# Patient Record
Sex: MALE | Race: OTHER
[De-identification: names, ages, dates, MRNs, and addresses within clinical notes are randomized per-mention and may not be internally consistent; named-entity substitution may affect disease eponyms.]

---

## 2022-07-05 ENCOUNTER — OFFICE VISIT (OUTPATIENT)
Dept: PRIMARY CARE CLINIC | Facility: CLINIC | Age: 42
End: 2022-07-05

## 2022-07-05 VITALS
HEART RATE: 81 BPM | TEMPERATURE: 98.1 F | DIASTOLIC BLOOD PRESSURE: 84 MMHG | BODY MASS INDEX: 38.87 KG/M2 | WEIGHT: 198 LBS | SYSTOLIC BLOOD PRESSURE: 136 MMHG | OXYGEN SATURATION: 96 % | RESPIRATION RATE: 16 BRPM | HEIGHT: 60 IN

## 2022-07-05 DIAGNOSIS — H10.9 BACTERIAL CONJUNCTIVITIS OF LEFT EYE: Primary | ICD-10-CM

## 2022-07-05 PROCEDURE — 99203 OFFICE O/P NEW LOW 30 MIN: CPT | Performed by: NURSE PRACTITIONER

## 2022-07-05 RX ORDER — POLYMYXIN B SULFATE AND TRIMETHOPRIM 1; 10000 MG/ML; [USP'U]/ML
1 SOLUTION OPHTHALMIC EVERY 4 HOURS
Qty: 1 EACH | Refills: 0 | Status: SHIPPED | OUTPATIENT
Start: 2022-07-05 | End: 2022-07-12

## 2022-07-05 SDOH — ECONOMIC STABILITY: FOOD INSECURITY: WITHIN THE PAST 12 MONTHS, THE FOOD YOU BOUGHT JUST DIDN'T LAST AND YOU DIDN'T HAVE MONEY TO GET MORE.: NEVER TRUE

## 2022-07-05 SDOH — ECONOMIC STABILITY: FOOD INSECURITY: WITHIN THE PAST 12 MONTHS, YOU WORRIED THAT YOUR FOOD WOULD RUN OUT BEFORE YOU GOT MONEY TO BUY MORE.: NEVER TRUE

## 2022-07-05 ASSESSMENT — PATIENT HEALTH QUESTIONNAIRE - PHQ9
SUM OF ALL RESPONSES TO PHQ9 QUESTIONS 1 & 2: 0
1. LITTLE INTEREST OR PLEASURE IN DOING THINGS: 0
SUM OF ALL RESPONSES TO PHQ QUESTIONS 1-9: 0
2. FEELING DOWN, DEPRESSED OR HOPELESS: 0
SUM OF ALL RESPONSES TO PHQ QUESTIONS 1-9: 0

## 2022-07-05 ASSESSMENT — ENCOUNTER SYMPTOMS
EYE REDNESS: 1
EYE DISCHARGE: 1
EYE ITCHING: 0
BLURRED VISION: 1
SORE THROAT: 0
PHOTOPHOBIA: 0
COUGH: 0
DOUBLE VISION: 0
EYE PAIN: 1

## 2022-07-05 ASSESSMENT — SOCIAL DETERMINANTS OF HEALTH (SDOH): HOW HARD IS IT FOR YOU TO PAY FOR THE VERY BASICS LIKE FOOD, HOUSING, MEDICAL CARE, AND HEATING?: NOT HARD AT ALL

## 2022-07-05 ASSESSMENT — VISUAL ACUITY: OU: 1

## 2022-07-05 NOTE — PATIENT INSTRUCTIONS
Patient Education        Conjuntivitis: Instrucciones de cuidado  [place]: Care Instructions  Instrucciones de cuidado     La conjuntivitis es el enrojecimiento y la hinchazón de la superficie del angy y de la conjuntiva (el recubrimiento del párpado y de la parte aissatou del angy). La conjuntivitis suele ser causada por dangelo infección bacteriana o viral. El aire seco, las [place], el humo y las sustancias químicas son otras causascomunes. La conjuntivitis suele sanar por sí kareem al cabo de 7 a 10 días. Los antibióticos solo ayudan si la conjuntivitis está causada por bacterias. La conjuntivitis causada por dangelo infección se propaga fácilmente. Si dangelo alergia o sustancia química es la causa de la conjuntivitis, esta no desaparecerá a menosque usted evite lo que la esté causando. La atención de seguimiento es dangelo parte clave de jorgensen tratamiento y seguridad. Asegúrese de hacer y acudir a todas las citas, y llame a jorgensen médico si está teniendo problemas. También es dangelo buena idea saber los [place] de susexámenes y mantener dangelo lista de los medicamentos que sonya. ¿Cómo puede cuidarse en el hogar? Mikala Beaulieuowa 5 tasia con frecuencia. Siempre láveselas antes y después de tratarse la conjuntivitis o de tocarse los ojos o la deborah.  Utilice un algodón húmedo o un paño limpio y húmedo para retirar las costras. Limpie desde la esquina interior del angy hacia afuera. Use dangelo parte limpia del paño para cada pasada.  Colóquese paños húmedos, fríos o tibios, sobre el angy unas cuantas veces al día si el angy le duele.  No use lentes de contacto ni maquillaje para los ojos hasta que la conjuntivitis haya desaparecido. Deseche todo el maquillaje para ojos que usaba cuando comenzó la conjuntivitis. Limpie ifrah lentes de contacto y jorgensen estuche. Si [place] lentes de contacto desechables, use un par nuevo cuando el angy haya sanado y sea seguro volver a usar lentes de contacto.    Si el médico le recetó dangelo pomada o gotas antibióticas para los ojos, [facility]. Use el medicamento todo el tiempo indicado, aunque el angy comience a verse mejor en poco tiempo. Mantenga limpia la punta del frasco y no permita que la misma toque la cha del angy.  Para ponerse gotas para los ojos o pomada:  ? Incline la [place] atrás y lleve el párpado inferior hacia abajo con un dedo. ? Deje caer unas gotas o un chorrito del medicamento dentro del párpado inferior. ? Cierre el angy por entre 30 y 61 segundos para permitir que las gotas o la pomada se esparzan. ? No permita que la punta del gotero o del tubo de pomada toque las pestañas ni ninguna otra superficie.  No comparta toallas de baño, almohadas ni toallitas para la deborah mientras tenga conjuntivitis. ¿Cuándo debe pedir ayuda? Llame a jorgensen médico ahora mismo o busque atención médica inmediata si:     Tiene dolor en el angy, no solo irritación en la superficie.      Tiene algún cambio en la vista o pérdida de la visión.      Tiene mayor secreción del angy.      El angy no ha comenzado a mejorar o empeora dentro de las 50 horas después de empezar a usar antibióticos.      La conjuntivitis dura más de 7 días. Preste especial atención a los cambios en jorgensen duane y asegúrese de comunicarsecon jorgensen médico si tiene algún problema. ¿Dónde puede encontrar más información en inglés? Kailash Arenasta a [email or web address] e ingrese a jorgensen cuenta de Laila. Tani Iqbal Y392 en el cuadro \"Search Health Information\" para más información (en inglés) sobre \"Conjuntivitis: Instrucciones de cuidado. \"     Si no tiene dangelo cuenta, apolonia clic en el enlace \"Sign Up Now\". Revisado: 9 marzo, 2022               Versión del contenido: 13.3  © 6120-5463 [employer], [employer]. Las instrucciones de cuidado fueron adaptadas bajo licencia por [facility] ([facility]). Si usted tiene [place] afección médica o sobre estas instrucciones, siempre pregunte a jorgensen profesional de duane.  Zmanda, [employer] niega toda garantía o responsabilidad por jorgensen uso de esta información.

## 2022-07-05 NOTE — PROGRESS NOTES
[facility] (: 1980) Interpretation provided by [facility] #[id number]    Patient presents as walk in w/ wife for eye swelling. Other  Pertinent negatives include no chills, coughing, fever, rash or sore throat. Eye Problem   The left eye is affected. Chronicity: Swelling began Friday w/ tearing and pain in the internal lacrimal duct. There was no injury mechanism. He does not wear contacts (Does not wear glasses). Associated symptoms include blurred vision (little blurriness w/ the tearing), an eye discharge (yellow discharge this morning) and eye redness. Pertinent negatives include no double vision, fever, itching or photophobia. Treatments tried: camomile tea application. Chief Complaint   Patient presents with    Other     eye swelling        Reviewed and updated this visit by provider:  Tobacco  Allergies  Meds  Problems  Med Hx  Surg Hx  Fam Hx           Immunizations:  Immunization status: up to date and documented. Review of Systems:   Review of Systems   Constitutional: Negative for chills and fever. HENT: Negative for sore throat. Eyes: Positive for blurred vision (little blurriness w/ the tearing), pain, discharge (yellow discharge this morning) and redness. Negative for double vision, photophobia and itching. Respiratory: Negative for cough. Skin: Negative for rash. Allergic/Immunologic: Negative for environmental allergies and food allergies. /84 (Site: Left Upper Arm, Position: Sitting)   Pulse 81   Temp 98.1 °F (36.7 °C)   Resp 16   Ht 5' (1.524 m)   Wt 198 lb (89.8 kg)   SpO2 96%   BMI 38.67 kg/m²     Physical Examination: Physical Exam  Constitutional:       Appearance: Normal appearance. HENT:      Head: Normocephalic and atraumatic.       Right Ear: Tympanic membrane, ear canal and external ear normal.      Left Ear: Tympanic membrane, ear canal and external ear normal.      Nose: Nose normal.      Mouth/Throat:      Mouth: Mucous membranes are moist.      Pharynx: No oropharyngeal exudate or posterior oropharyngeal erythema. Eyes:      General: Lids are normal. Vision grossly intact. No allergic shiner. Right eye: No foreign body, discharge or hordeolum. Left eye: Discharge (purulent) present. No foreign body or hordeolum. Conjunctiva/sclera:      Right eye: Right conjunctiva is not injected. No exudate or hemorrhage. Left eye: Left conjunctiva is injected. Pupils: Pupils are equal, round, and reactive to light. Cardiovascular:      Rate and Rhythm: Normal rate and regular rhythm. Pulses: Normal pulses. Heart sounds: Normal heart sounds. Pulmonary:      Effort: Pulmonary effort is normal.      Breath sounds: Normal breath sounds. Lymphadenopathy:      Cervical: No cervical adenopathy. Skin:     General: Skin is warm and dry. Neurological:      Mental Status: He is alert. No results found for this visit on 07/05/22. Assessment/Plan:  1. Bacterial conjunctivitis of left eye  -     trimethoprim-polymyxin b (POLYTRIM) 50394-6.1 UNIT/ML-% ophthalmic solution; Place 1 drop into the left eye every 4 hours for 7 days, Disp-1 each, R-0Normal       ·       Apply warm compresses for comfort, use a different washcloth for each eye  ·       Change out eye make-up or other eye products if used, and avoid use while the problem is resolving. Apply polytrim as prescribed  See educational handout  F/u if no improvement or symptoms persist/worsen       No follow-up provider specified.       CURTIS Cortes - NP